# Patient Record
Sex: MALE | Race: WHITE | Employment: FULL TIME | URBAN - METROPOLITAN AREA
[De-identification: names, ages, dates, MRNs, and addresses within clinical notes are randomized per-mention and may not be internally consistent; named-entity substitution may affect disease eponyms.]

---

## 2022-07-27 ENCOUNTER — APPOINTMENT (OUTPATIENT)
Dept: GENERAL RADIOLOGY | Age: 42
End: 2022-07-27
Payer: COMMERCIAL

## 2022-07-27 ENCOUNTER — HOSPITAL ENCOUNTER (EMERGENCY)
Age: 42
Discharge: ANOTHER HOSPITAL | End: 2022-07-27
Attending: EMERGENCY MEDICINE
Payer: COMMERCIAL

## 2022-07-27 VITALS
DIASTOLIC BLOOD PRESSURE: 78 MMHG | HEART RATE: 66 BPM | SYSTOLIC BLOOD PRESSURE: 108 MMHG | TEMPERATURE: 98.9 F | OXYGEN SATURATION: 97 % | RESPIRATION RATE: 15 BRPM

## 2022-07-27 DIAGNOSIS — S61.511A LACERATION OF RIGHT WRIST, INITIAL ENCOUNTER: Primary | ICD-10-CM

## 2022-07-27 PROCEDURE — 2500000003 HC RX 250 WO HCPCS: Performed by: EMERGENCY MEDICINE

## 2022-07-27 PROCEDURE — 6370000000 HC RX 637 (ALT 250 FOR IP): Performed by: EMERGENCY MEDICINE

## 2022-07-27 PROCEDURE — 73110 X-RAY EXAM OF WRIST: CPT

## 2022-07-27 PROCEDURE — 99285 EMERGENCY DEPT VISIT HI MDM: CPT

## 2022-07-27 RX ORDER — OXYCODONE HYDROCHLORIDE AND ACETAMINOPHEN 5; 325 MG/1; MG/1
1 TABLET ORAL ONCE
Status: COMPLETED | OUTPATIENT
Start: 2022-07-27 | End: 2022-07-27

## 2022-07-27 RX ORDER — LIDOCAINE HYDROCHLORIDE AND EPINEPHRINE BITARTRATE 20; .01 MG/ML; MG/ML
20 INJECTION, SOLUTION SUBCUTANEOUS ONCE
Status: COMPLETED | OUTPATIENT
Start: 2022-07-27 | End: 2022-07-27

## 2022-07-27 RX ADMIN — LIDOCAINE HYDROCHLORIDE AND EPINEPHRINE 20 ML: 20; 10 INJECTION, SOLUTION INFILTRATION; PERINEURAL at 17:01

## 2022-07-27 RX ADMIN — OXYCODONE HYDROCHLORIDE AND ACETAMINOPHEN 1 TABLET: 5; 325 TABLET ORAL at 17:01

## 2022-07-27 ASSESSMENT — ENCOUNTER SYMPTOMS
ABDOMINAL PAIN: 0
BACK PAIN: 0
WHEEZING: 0
TROUBLE SWALLOWING: 0
SHORTNESS OF BREATH: 0
NAUSEA: 0
PHOTOPHOBIA: 0
COLOR CHANGE: 0
VOMITING: 0
VOICE CHANGE: 0
STRIDOR: 0
BLOOD IN STOOL: 0
FACIAL SWELLING: 0

## 2022-07-27 ASSESSMENT — PAIN SCALES - GENERAL
PAINLEVEL_OUTOF10: 9
PAINLEVEL_OUTOF10: 0
PAINLEVEL_OUTOF10: 9

## 2022-07-27 ASSESSMENT — PAIN DESCRIPTION - LOCATION: LOCATION: WRIST

## 2022-07-27 ASSESSMENT — PAIN - FUNCTIONAL ASSESSMENT: PAIN_FUNCTIONAL_ASSESSMENT: 0-10

## 2022-07-27 ASSESSMENT — PAIN DESCRIPTION - ORIENTATION: ORIENTATION: RIGHT

## 2022-07-27 ASSESSMENT — PAIN DESCRIPTION - FREQUENCY: FREQUENCY: CONTINUOUS

## 2022-07-27 NOTE — ED PROVIDER NOTES
[address] [facility]  eMERGENCY dEPARTMENT eNCOUnter      Pt Name: Renata Millan  MRN: [id number]  [place] 1980  Date of evaluation: 7/27/2022  Provider: Trav Harden MD    CHIEF COMPLAINT       Chief Complaint   Patient presents with    Laceration     Right wrist laceration from falling on a glass table. HISTORY OF PRESENT ILLNESS   (Location/Symptom, Timing/Onset, Context/Setting, Quality, Duration, Modifying Factors, Severity)  Note limiting factors. Renata Millan is a 38 y.o. male who presents with a large laceration to his right wrist after falling through a glass table approximately 30 minutes prior to arrival.  Patient reports that his last tetanus shot was only 3 months ago. Patient reports pain is onset severe constant and unchanged. Reports tactile pressure worsens the pain and nothing improves it. He reports decreased sensation but not absent sensation to his right distal hand. HPI    Nursing Notes were reviewed. REVIEW OFSYSTEMS    (2-9 systems for level 4, 10 or more for level 5)     Review of Systems   Constitutional:  Negative for appetite change, fever and unexpected weight change. HENT:  Negative for drooling, facial swelling, trouble swallowing and voice change. Eyes:  Negative for photophobia and visual disturbance. Respiratory:  Negative for shortness of breath, wheezing and stridor. Cardiovascular:  Negative for chest pain and palpitations. Gastrointestinal:  Negative for abdominal pain, blood in stool, nausea and vomiting. Genitourinary:  Negative for difficulty urinating and dysuria. Musculoskeletal:  Negative for back pain, gait problem and neck pain. Skin:  Positive for wound. Negative for color change. Neurological:  Positive for numbness. Negative for seizures, syncope and speech difficulty. Psychiatric/Behavioral:  Negative for self-injury and suicidal ideas.       Except as noted above the remainder of the review of systems was reviewed and negative. PAST MEDICAL HISTORY   History reviewed. No pertinent past medical history. SURGICAL HISTORY     History reviewed. No pertinent surgical history. CURRENT MEDICATIONS       Previous Medications    No medications on file       ALLERGIES     Patient has no known allergies. FAMILY HISTORY     History reviewed. No pertinent family history. SOCIAL HISTORY       Social History     Socioeconomic History    Marital status: Single     Spouse name: None    Number of children: None    Years of education: None    Highest education level: None   Tobacco Use    Smoking status: Unknown   Substance and Sexual Activity    Alcohol use: Not Currently    Drug use: Not Currently         PHYSICAL EXAM    (up to 7 for level 4, 8 or more for level 5)     ED Triage Vitals   BP Temp Temp Source Heart Rate Resp SpO2 Height Weight   07/27/22 1652 07/27/22 1734 07/27/22 1734 07/27/22 1652 07/27/22 1652 07/27/22 1652 -- --   94/60 98.9 °F (37.2 °C) Oral 60 18 98 %         Physical Exam  Vitals and nursing note reviewed. Constitutional:       Appearance: He is well-developed. HENT:      Head: Normocephalic and atraumatic. Right Ear: External ear normal.      Left Ear: External ear normal.   Eyes:      Conjunctiva/sclera: Conjunctivae normal.   Neck:      Vascular: No JVD. Trachea: No tracheal deviation. Cardiovascular:      Rate and Rhythm: Normal rate. Comments: 2+ radial pulse. No ulnar pulse able to be palpated. Pulmonary:      Effort: Pulmonary effort is normal. No respiratory distress. Breath sounds: Normal breath sounds. No wheezing. Abdominal:      General: There is no distension. Palpations: Abdomen is soft. Tenderness: There is no abdominal tenderness. There is no guarding or rebound. Musculoskeletal:         General: Tenderness, deformity and signs of injury present. Normal range of motion. Cervical back: Neck supple.       Comments: 7 cm gaping laceration to the volar ulnar right wrist.  Continuous venous oozing with no arterial bleeding. Skin:     General: Skin is warm and dry. Neurological:      Mental Status: He is alert. Cranial Nerves: No cranial nerve deficit. Comments: Sensation mildly decreased in ulnar nerve distribution compared to radial and medial distribution however is present in all distributions. DIAGNOSTIC RESULTS     RADIOLOGY:     Interpretation per the Radiologist below, if available at the time of this note:    XR WRIST RIGHT (MIN 3 VIEWS)   Final Result   1. No acute osseous abnormality. EMERGENCY DEPARTMENT COURSE and DIFFERENTIAL DIAGNOSIS/MDM:   Vitals:    Vitals:    07/27/22 1652 07/27/22 1734   BP: 94/60 108/78   Pulse: 60 66   Resp: 18 15   Temp:  98.9 °F (37.2 °C)   TempSrc:  Oral   SpO2: 98% 97%         MDM  Immediate attention given the patient including 2% lidocaine with epinephrine with a total of 10ml injected locally into the immediate distal soft tissue of the laceration to facilitate  more detailed exam no nerve block was placed. Close exam is performed in bloodless field with adequate lighting under anesthesia and no glass or foreign body is appreciated however if there is significant soft tissue damage and no ulnar pulses appreciated even further to palpation or with the use of a pencil Doppler. Plain film does not show any acute bony abnormality. Based on lack of ulnar pulse the patient is emergently transferred to [facility]. I spoken to the [facility] ER physician who is excepted the patient in transfer. I have offered emergent transport however the patient's fiancé is present here and the patient strongly prefers that he go private vehicle. The importance of going directly to [facility] is highly stressed to the patient. Pressure dressing was applied and the wound is hemostatic at time of transfer.      Critical Care    Date/Time: 7/27/2022 6:34 PM  Performed by: Elena Wise MD  Authorized by: Elena Wise MD     Critical care provider statement:     Critical care time (minutes):  32    Critical care time was exclusive of:  Separately billable procedures and treating other patients and teaching time    Critical care was necessary to treat or prevent imminent or life-threatening deterioration of the following conditions:  Trauma, shock and circulatory failure    Critical care was time spent personally by me on the following activities:  Development of treatment plan with patient or surrogate, evaluation of patient's response to treatment, examination of patient, obtaining history from patient or surrogate, ordering and review of radiographic studies and re-evaluation of patient's condition    FINAL IMPRESSION      1. Laceration of right wrist, initial encounter          DISPOSITION/PLAN   DISPOSITION Decision To Transfer 07/27/2022 05:20:34 PM             (Please note that portions of this note were completed with a voice recognition program.  Efforts were made to edit the dictations but occasionally words aremis-transcribed. )    Elena Wise MD (electronically signed)  Attending Emergency Physician           Elena Wise MD  07/28/22 [id number]

## 2022-07-27 NOTE — ED NOTES
Patients wrist cleaned and wrapped. He is aware he is to go straight to [facility] for evaluation. His family member is driving him to the ED.        Viktor Ha RN  07/27/22 [id number]

## 2022-07-27 NOTE — ED NOTES
Patient arrived via private car to the ED with a laceration to the right inner wrist.  He states he tripped and fell into a glass table in the basement.   Patient is A&O, diaphoretic upon arrival.       Yuriy Son RN  07/27/22 [id number]